# Patient Record
Sex: FEMALE | URBAN - METROPOLITAN AREA
[De-identification: names, ages, dates, MRNs, and addresses within clinical notes are randomized per-mention and may not be internally consistent; named-entity substitution may affect disease eponyms.]

---

## 2021-05-06 ENCOUNTER — APPOINTMENT (RX ONLY)
Dept: URBAN - METROPOLITAN AREA CLINIC 342 | Facility: CLINIC | Age: 76
Setting detail: DERMATOLOGY
End: 2021-05-06

## 2021-05-06 PROBLEM — D23.39 OTHER BENIGN NEOPLASM OF SKIN OF OTHER PARTS OF FACE: Status: ACTIVE | Noted: 2021-05-06

## 2021-05-06 PROCEDURE — ? ADDITIONAL NOTES

## 2021-05-06 PROCEDURE — ? PHOTO-DOCUMENTATION

## 2021-05-06 PROCEDURE — ? COUNSELING

## 2021-05-06 PROCEDURE — 99202 OFFICE O/P NEW SF 15 MIN: CPT

## 2021-07-01 ENCOUNTER — APPOINTMENT (RX ONLY)
Dept: URBAN - METROPOLITAN AREA CLINIC 342 | Facility: CLINIC | Age: 76
Setting detail: DERMATOLOGY
End: 2021-07-01

## 2021-07-01 DIAGNOSIS — L81.5 LEUKODERMA, NOT ELSEWHERE CLASSIFIED: ICD-10-CM | Status: STABLE

## 2021-07-01 DIAGNOSIS — L81.4 OTHER MELANIN HYPERPIGMENTATION: ICD-10-CM | Status: STABLE

## 2021-07-01 DIAGNOSIS — D22 MELANOCYTIC NEVI: ICD-10-CM | Status: STABLE

## 2021-07-01 DIAGNOSIS — D69.2 OTHER NONTHROMBOCYTOPENIC PURPURA: ICD-10-CM | Status: STABLE

## 2021-07-01 DIAGNOSIS — L73.8 OTHER SPECIFIED FOLLICULAR DISORDERS: ICD-10-CM | Status: STABLE

## 2021-07-01 DIAGNOSIS — D18.0 HEMANGIOMA: ICD-10-CM | Status: STABLE

## 2021-07-01 DIAGNOSIS — L82.1 OTHER SEBORRHEIC KERATOSIS: ICD-10-CM | Status: STABLE

## 2021-07-01 DIAGNOSIS — L82.0 INFLAMED SEBORRHEIC KERATOSIS: ICD-10-CM | Status: INADEQUATELY CONTROLLED

## 2021-07-01 PROBLEM — D22.9 MELANOCYTIC NEVI, UNSPECIFIED: Status: ACTIVE | Noted: 2021-07-01

## 2021-07-01 PROBLEM — D23.39 OTHER BENIGN NEOPLASM OF SKIN OF OTHER PARTS OF FACE: Status: ACTIVE | Noted: 2021-07-01

## 2021-07-01 PROBLEM — D18.01 HEMANGIOMA OF SKIN AND SUBCUTANEOUS TISSUE: Status: ACTIVE | Noted: 2021-07-01

## 2021-07-01 PROBLEM — D48.5 NEOPLASM OF UNCERTAIN BEHAVIOR OF SKIN: Status: ACTIVE | Noted: 2021-07-01

## 2021-07-01 PROCEDURE — 99213 OFFICE O/P EST LOW 20 MIN: CPT | Mod: 25

## 2021-07-01 PROCEDURE — 17110 DESTRUCTION B9 LES UP TO 14: CPT

## 2021-07-01 PROCEDURE — ? BIOPSY BY SHAVE METHOD

## 2021-07-01 PROCEDURE — ? COUNSELING

## 2021-07-01 PROCEDURE — ? ADDITIONAL NOTES

## 2021-07-01 PROCEDURE — 11102 TANGNTL BX SKIN SINGLE LES: CPT | Mod: 59

## 2021-07-01 PROCEDURE — ? FULL BODY SKIN EXAM

## 2021-07-01 PROCEDURE — ? LIQUID NITROGEN

## 2021-07-01 ASSESSMENT — LOCATION DETAILED DESCRIPTION DERM
LOCATION DETAILED: RIGHT SUPERIOR MEDIAL MIDBACK
LOCATION DETAILED: LEFT POSTERIOR SHOULDER
LOCATION DETAILED: UPPER STERNUM
LOCATION DETAILED: SUPERIOR THORACIC SPINE
LOCATION DETAILED: RIGHT PROXIMAL DORSAL FOREARM
LOCATION DETAILED: LEFT DISTAL DORSAL FOREARM
LOCATION DETAILED: LEFT PROXIMAL DORSAL FOREARM
LOCATION DETAILED: RIGHT INFERIOR MEDIAL FOREHEAD
LOCATION DETAILED: RIGHT CLAVICULAR SKIN
LOCATION DETAILED: EPIGASTRIC SKIN
LOCATION DETAILED: RIGHT POSTERIOR SHOULDER
LOCATION DETAILED: RIGHT INFERIOR UPPER BACK
LOCATION DETAILED: LEFT MEDIAL BREAST 8-9:00 REGION
LOCATION DETAILED: RIGHT DISTAL DORSAL FOREARM
LOCATION DETAILED: UMBILICUS
LOCATION DETAILED: LEFT INFERIOR CENTRAL MALAR CHEEK

## 2021-07-01 ASSESSMENT — LOCATION SIMPLE DESCRIPTION DERM
LOCATION SIMPLE: LEFT CHEEK
LOCATION SIMPLE: CHEST
LOCATION SIMPLE: RIGHT LOWER BACK
LOCATION SIMPLE: RIGHT UPPER BACK
LOCATION SIMPLE: RIGHT FOREARM
LOCATION SIMPLE: RIGHT SHOULDER
LOCATION SIMPLE: UPPER BACK
LOCATION SIMPLE: LEFT FOREARM
LOCATION SIMPLE: RIGHT CLAVICULAR SKIN
LOCATION SIMPLE: ABDOMEN
LOCATION SIMPLE: RIGHT FOREHEAD
LOCATION SIMPLE: LEFT SHOULDER
LOCATION SIMPLE: LEFT BREAST

## 2021-07-01 ASSESSMENT — LOCATION ZONE DERM
LOCATION ZONE: FACE
LOCATION ZONE: ARM
LOCATION ZONE: TRUNK

## 2021-07-01 NOTE — PROCEDURE: BIOPSY BY SHAVE METHOD
Detail Level: Detailed
Depth Of Biopsy: dermis
Was A Bandage Applied: Yes
Size Of Lesion In Cm: 1.2
X Size Of Lesion In Cm: 0
Anticipated Plan (Based On Presumed Biopsy Results): ED&C
Biopsy Type: H and E
Biopsy Method: Personna blade
Anesthesia Type: 1% lidocaine with epinephrine
Anesthesia Volume In Cc (Will Not Render If 0): 0.5
Hemostasis: Electrocautery and Aluminum Chloride
Wound Care: Petrolatum
Dressing: bandage
Destruction After The Procedure: No
Type Of Destruction Used: Curettage
Curettage Text: The wound bed was treated with curettage after the biopsy was performed.
Cryotherapy Text: The wound bed was treated with cryotherapy after the biopsy was performed.
Electrodesiccation Text: The wound bed was treated with electrodesiccation after the biopsy was performed.
Electrodesiccation And Curettage Text: The wound bed was treated with electrodesiccation and curettage after the biopsy was performed.
Silver Nitrate Text: The wound bed was treated with silver nitrate after the biopsy was performed.
Lab: [id number]
Lab Facility: [id number]
Consent: Written consent was obtained and risks were reviewed including but not limited to scarring, infection, bleeding, scabbing, incomplete removal, nerve damage and allergy to anesthesia.
Post-Care Instructions: I reviewed with the patient in detail post-care instructions. Patient is to keep the biopsy site dry overnight, and then apply Vaseline or aquaphor twice daily until healed. Keep biopsy site clean with soap and water.
Notification Instructions: Patient will be notified of biopsy results. However, patient instructed to call the office if not contacted within 2 weeks.
Billing Type: Third-Party Bill
Information: Selecting Yes will display possible errors in your note based on the variables you have selected. This validation is only offered as a suggestion for you. PLEASE NOTE THAT THE VALIDATION TEXT WILL BE REMOVED WHEN YOU FINALIZE YOUR NOTE. IF YOU WANT TO FAX A PRELIMINARY NOTE YOU WILL NEED TO TOGGLE THIS TO 'NO' IF YOU DO NOT WANT IT IN YOUR FAXED NOTE.

## 2021-07-29 ENCOUNTER — APPOINTMENT (RX ONLY)
Dept: URBAN - METROPOLITAN AREA CLINIC 342 | Facility: CLINIC | Age: 76
Setting detail: DERMATOLOGY
End: 2021-07-29

## 2021-07-29 PROBLEM — C44.719 BASAL CELL CARCINOMA OF SKIN OF LEFT LOWER LIMB, INCLUDING HIP: Status: ACTIVE | Noted: 2021-07-29

## 2021-07-29 PROCEDURE — ? CURETTAGE AND DESTRUCTION

## 2021-07-29 PROCEDURE — ? ADDITIONAL NOTES

## 2021-07-29 PROCEDURE — 17262 DSTRJ MAL LES T/A/L 1.1-2.0: CPT

## 2021-07-29 NOTE — PROCEDURE: CURETTAGE AND DESTRUCTION
Detail Level: Detailed
Biopsy Photograph Reviewed: Yes
Number Of Curettages: 3
Size Of Lesion In Cm: 1.2
Size Of Lesion After Curettage: 1.7
Add Intralesional Injection: No
Total Volume (Ccs): 1
Anesthesia Type: 1% lidocaine with epinephrine
Anesthesia Volume In Cc: 1.5
Cautery Type: electrodesiccation
What Was Performed First?: Curettage
Final Size Statement: The size of the lesion after curettage was
Additional Information: (Optional): The wound was cleaned, and a pressure dressing was applied.  The patient received detailed post-op instructions.
Consent was obtained from the patient. The risks, benefits and alternatives to therapy were discussed in detail. Specifically, the risks of infection, scarring, bleeding, prolonged wound healing, nerve injury, incomplete removal, allergy to anesthesia and recurrence were addressed. Alternatives to ED&C, such as: surgical removal and XRT were also discussed.  Prior to the procedure, the treatment site was clearly identified and confirmed by the patient. All components of Universal Protocol/PAUSE Rule completed.
Post-Care Instructions: I reviewed with the patient in detail post-care instructions. Patient is to keep the area dry for 48 hours, and not to engage in any swimming until the area is healed. Should the patient develop any fevers, chills, bleeding, severe pain patient will contact the office immediately.
Bill As A Line Item Or As Units: Line Item

## 2023-06-20 ENCOUNTER — APPOINTMENT (RX ONLY)
Dept: URBAN - METROPOLITAN AREA CLINIC 342 | Facility: CLINIC | Age: 78
Setting detail: DERMATOLOGY
End: 2023-06-20

## 2023-06-20 DIAGNOSIS — Z41.9 ENCOUNTER FOR PROCEDURE FOR PURPOSES OTHER THAN REMEDYING HEALTH STATE, UNSPECIFIED: ICD-10-CM

## 2023-06-20 PROCEDURE — ? COSMETIC CONSULTATION: CHEMICAL PEELS

## 2023-06-20 PROCEDURE — ? MEDICAL CONSULTATION: LASER RESURFACING

## 2023-06-20 PROCEDURE — ? PRODUCT LINE (ELTA MD)

## 2023-06-20 PROCEDURE — ? MEDICAL CONSULTATION HYDRAFACIAL

## 2023-06-20 PROCEDURE — ? VI PEEL

## 2023-06-20 NOTE — PROCEDURE: PRODUCT LINE (ELTA MD)
Product 38 Units: 0
Product 8 Price (In Dollars - Numeric Only, No Special Characters Or $): 0.00
Name Of Product 4: UV elements
Product 2 Price (In Dollars - Numeric Only, No Special Characters Or $): 38.00
Allow Plan To Count Towards E/M Coding: Yes
Product 4 Price (In Dollars - Numeric Only, No Special Characters Or $): 40.00
Product 6 Price (In Dollars - Numeric Only, No Special Characters Or $): 34.00
Product 4 Units: 1
Product 6 Application Directions: Apply dime to nickel size amount to face every morning concluding your skincare regimen. You may apply makeup on top of sunscreen if desired.
Product 2 Application Directions: Wash face am/pm\\nWet hands, pump one pump into hands, lather, and apply to face.\\nRemove cleanser with clean wet washcloth.\\nWith washcloth, wipe around eyes
Product 4 Application Directions: Apply nickel size amount in the AM post moisturizer
Name Of Product 7: Foaming cleanser (travel size)
Name Of Product 5: UV replenish
Name Of Product 3: Laser enzyme gel
Product 7 Price (In Dollars - Numeric Only, No Special Characters Or $): 20.00
Name Of Product 1: UV Pure
Detail Level: Zone
Product 3 Price (In Dollars - Numeric Only, No Special Characters Or $): 17.50
Product 1 Application Directions: Apply moisturizer prior to sunscreen application\\nReapply sun screen every 90 mins during sun exposure \\nUtilize sugar scrub body exfoliation to remove sunblock
Assigning Risk Information: Per AMA, level of risk is based upon consequences of the problem(s) addressed at the encounter when appropriately treated. Risk also includes medical decision making related to the need to initiate or forego further testing, treatment and/or hospitalization. Over the counter medication are assigned a risk level of low. Prescription medication management is assigned a risk level of moderate.
Product 5 Application Directions: Use every morning post moisturizer
Product 1 Price (In Dollars - Numeric Only, No Special Characters Or $): 28.00
Name Of Product 2: Elta MD foaming cleanser
Product 3 Application Directions: Apply nickel size amount to face AM/PM after you?ve cleansed your face. Avoid eye area.
Name Of Product 6: UV physical
Risk Of Complication Category: No MDM

## 2023-06-20 NOTE — PROCEDURE: VI PEEL
Detail Level: Zone
Consent: Prior to the procedure, written consent was obtained and risks were reviewed, including but not limited to: redness, peeling, blistering, pigmentary change, scarring, infection, and pain. Patient is aware multiple treatments may be necessary to achieve the desired outcome.
Post-Care Instructions: I reviewed with the patient in detail post-care instructions. Patient should avoid sun exposure and wear sun protection. Provided patient with a new clean sterile disposable face mask post treatment.
Price (Use Numbers Only, No Special Characters Or $): 299.00
Chemical Peel: VI Peel Advanced
Comments: Vi peel promotion
Prep: The treated area was degreased with pre-peel cleanser
Post Peel Care: After the procedure, the patient was instructed not to wash the treated area for 4 hours or manually remove dead skin when the peeling process starts. Patient may use OTC hydrocortisone cream for itching.  Patient instructed to use the provided Retin-A wipes on the treated area on the 1st and 2nd nights. Patient advised to follow instructions for 7 days before returning to regular skincare regimen.\\n\\nInstructed patient to email me with any questions or concerns
Treatment Number: 1